# Patient Record
Sex: FEMALE | Race: WHITE | Employment: FULL TIME | URBAN - METROPOLITAN AREA
[De-identification: names, ages, dates, MRNs, and addresses within clinical notes are randomized per-mention and may not be internally consistent; named-entity substitution may affect disease eponyms.]

---

## 2017-03-06 ENCOUNTER — APPOINTMENT (OUTPATIENT)
Dept: GENERAL RADIOLOGY | Age: 57
End: 2017-03-06
Attending: PHYSICIAN ASSISTANT
Payer: COMMERCIAL

## 2017-03-06 ENCOUNTER — HOSPITAL ENCOUNTER (EMERGENCY)
Age: 57
Discharge: HOME | End: 2017-03-06
Attending: EMERGENCY MEDICINE
Payer: COMMERCIAL

## 2017-03-06 VITALS
HEART RATE: 78 BPM | WEIGHT: 135 LBS | BODY MASS INDEX: 21.19 KG/M2 | DIASTOLIC BLOOD PRESSURE: 90 MMHG | OXYGEN SATURATION: 97 % | SYSTOLIC BLOOD PRESSURE: 190 MMHG | RESPIRATION RATE: 18 BRPM | HEIGHT: 67 IN | TEMPERATURE: 98 F

## 2017-03-06 DIAGNOSIS — S92.244A CLOSED NONDISPLACED FRACTURE OF MEDIAL CUNEIFORM OF RIGHT FOOT, INITIAL ENCOUNTER: Primary | ICD-10-CM

## 2017-03-06 PROCEDURE — 99284 EMERGENCY DEPT VISIT MOD MDM: CPT

## 2017-03-06 PROCEDURE — 73630 X-RAY EXAM OF FOOT: CPT

## 2017-03-06 PROCEDURE — 29515 APPLICATION SHORT LEG SPLINT: CPT

## 2017-03-06 RX ORDER — HYDROCODONE BITARTRATE AND ACETAMINOPHEN 5; 325 MG/1; MG/1
1 TABLET ORAL ONCE
Status: COMPLETED | OUTPATIENT
Start: 2017-03-06 | End: 2017-03-06

## 2017-03-06 RX ORDER — HYDROCODONE BITARTRATE AND ACETAMINOPHEN 5; 325 MG/1; MG/1
1 TABLET ORAL EVERY 6 HOURS PRN
Qty: 17 TABLET | Refills: 0 | Status: SHIPPED | OUTPATIENT
Start: 2017-03-06 | End: 2019-01-21 | Stop reason: ALTCHOICE

## 2017-03-06 NOTE — ED PROVIDER NOTES
I reviewed that chart and discussed the case. I have examined the patient and noted right foot injury after trip and fall. I agree with the following clinical impression(s):   Foot fracture, immobilization, referral to orthopedist    I agree with the

## 2017-03-06 NOTE — ED PROVIDER NOTES
Patient Seen in: THE [facility] Emergency Department In [place]    History   Patient presents with:  Lower Extremity Injury (musculoskeletal)  Fall (musculoskeletal, neurologic)    Stated Complaint: FALL, RIGHT FOOT INJURY,  DENIES HITTING HEAD    HPI    62-y Temp src 03/06/17 1605 Oral   SpO2 03/06/17 1605 97 %   O2 Device 03/06/17 1605 None (Room air)       Current:/90 mmHg  Pulse 78  Temp(Src) 98.4 °F (36.9 °C) (Oral)  Resp 18  Ht 170.2 cm (5' 7\")  Wt 61.236 kg  BMI 21.14 kg/m2  SpO2 97%        Physic 3/6/2017  PROCEDURE:  XR FOOT, COMPLETE (MIN 3 VIEWS), RIGHT (CPT=73630)  TECHNIQUE:  AP, oblique, and lateral views were obtained. COMPARISON:  None.   INDICATIONS:  FALL, RIGHT FOOT INJURY,  DENIES HITTING HEAD  PATIENT STATED HISTORY:  Patient was walki Medications Prescribed:  Current Discharge Medication List    START taking these medications    HYDROcodone-acetaminophen 5-325 MG Oral Tab  Take 1 tablet by mouth every 6 (six) hours as needed for Pain.   Qty: 17 tablet Refills: 0  Associated Diagnoses:Melania

## 2019-08-19 PROBLEM — M65.4 DE QUERVAIN'S TENOSYNOVITIS, LEFT: Status: ACTIVE | Noted: 2019-08-19

## 2019-11-24 ENCOUNTER — APPOINTMENT (OUTPATIENT)
Dept: GENERAL RADIOLOGY | Age: 59
End: 2019-11-24
Attending: PHYSICIAN ASSISTANT
Payer: COMMERCIAL

## 2019-11-24 ENCOUNTER — HOSPITAL ENCOUNTER (EMERGENCY)
Age: 59
Discharge: HOME | End: 2019-11-24
Payer: COMMERCIAL

## 2019-11-24 VITALS
DIASTOLIC BLOOD PRESSURE: 88 MMHG | WEIGHT: 150 LBS | SYSTOLIC BLOOD PRESSURE: 163 MMHG | BODY MASS INDEX: 23.27 KG/M2 | TEMPERATURE: 98 F | OXYGEN SATURATION: 99 % | HEART RATE: 77 BPM | HEIGHT: 67.5 IN | RESPIRATION RATE: 16 BRPM
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
## 2019-11-24 DIAGNOSIS — S80.02XA CONTUSION OF LEFT KNEE, INITIAL ENCOUNTER: Primary | ICD-10-CM

## 2019-11-24 DIAGNOSIS — S80.212A ABRASION OF LEFT KNEE, INITIAL ENCOUNTER: ICD-10-CM

## 2019-11-24 DIAGNOSIS — M25.462 KNEE EFFUSION, LEFT: ICD-10-CM

## 2019-11-24 PROCEDURE — 99283 EMERGENCY DEPT VISIT LOW MDM: CPT

## 2019-11-24 PROCEDURE — 73562 X-RAY EXAM OF KNEE 3: CPT | Performed by: PHYSICIAN ASSISTANT

## 2019-11-24 NOTE — ED PROVIDER NOTES
Patient Seen in: [facility] In [place]      History   Patient presents with:  Lower Extremity Injury (musculoskeletal)    Stated Complaint: left knee injury that occurred yesterday     23-year-old  female with a history of hy Left knee: She exhibits decreased range of motion, swelling, effusion and bony tenderness.  She exhibits no ecchymosis, no deformity, no laceration, no erythema, normal alignment, no LCL laxity, normal patellar mobility, normal meniscus and no MCL laxity Contusion of left knee, initial encounter  (primary encounter diagnosis)  Abrasion of left knee, initial encounter  Knee effusion, left    Disposition:  Discharge  11/24/2019 12:14 pm    Follow-up:  MD Pete Brennan. Jagdish Carrasco 91 2

## 2019-12-26 ENCOUNTER — APPOINTMENT (OUTPATIENT)
Dept: GENERAL RADIOLOGY | Age: 59
End: 2019-12-26
Attending: EMERGENCY MEDICINE
Payer: COMMERCIAL

## 2019-12-26 ENCOUNTER — HOSPITAL ENCOUNTER (EMERGENCY)
Age: 59
Discharge: HOME | End: 2019-12-26
Attending: EMERGENCY MEDICINE
Payer: COMMERCIAL

## 2019-12-26 VITALS
OXYGEN SATURATION: 98 % | WEIGHT: 150 LBS | RESPIRATION RATE: 16 BRPM | SYSTOLIC BLOOD PRESSURE: 168 MMHG | BODY MASS INDEX: 23.54 KG/M2 | HEART RATE: 80 BPM | DIASTOLIC BLOOD PRESSURE: 78 MMHG | HEIGHT: 67 IN | TEMPERATURE: 98 F

## 2019-12-26 DIAGNOSIS — S39.012A STRAIN OF LUMBAR REGION, INITIAL ENCOUNTER: Primary | ICD-10-CM

## 2019-12-26 PROCEDURE — 99283 EMERGENCY DEPT VISIT LOW MDM: CPT

## 2019-12-26 PROCEDURE — 96372 THER/PROPH/DIAG INJ SC/IM: CPT

## 2019-12-26 PROCEDURE — 72110 X-RAY EXAM L-2 SPINE 4/>VWS: CPT | Performed by: EMERGENCY MEDICINE

## 2019-12-26 RX ORDER — TRAMADOL HYDROCHLORIDE 50 MG/1
50 TABLET ORAL EVERY 6 HOURS PRN
Qty: 10 TABLET | Refills: 0 | Status: SHIPPED | OUTPATIENT
Start: 2019-12-26 | End: 2020-08-20

## 2019-12-26 RX ORDER — KETOROLAC TROMETHAMINE 30 MG/ML
60 INJECTION, SOLUTION INTRAMUSCULAR; INTRAVENOUS ONCE
Status: COMPLETED | OUTPATIENT
Start: 2019-12-26 | End: 2019-12-26

## 2019-12-26 NOTE — ED INITIAL ASSESSMENT (HPI)
\"lifted a table on Monday, hurt my back, not getting any better\", denies loss of bowel/bladder, no radiation of pain, took naproxen at 0300 today, pain is lower middle back

## 2019-12-26 NOTE — ED PROVIDER NOTES
Patient Seen in: [facility] Emergency Department In [place]      History   Patient presents with:  Back Pain    Stated Complaint: back pain    HPI    The patient is a 40-year-old female who presents emergency room with a history of right-sided lower back 0711 98 °F (36.7 °C)   Temp src 12/26/19 0711 Temporal   SpO2 12/26/19 0711 98 %   O2 Device 12/26/19 0711 None (Room air)       Current:BP (!) 168/78   Pulse 80   Temp 98 °F (36.7 °C) (Temporal)   Resp 16   Ht 170.2 cm (5' 7\")   Wt 68 kg   SpO2 98%   BMI 12/26/2019  CONCLUSION:   Mild which deformity noted of the T12 vertebral body which is overall age indeterminate. If there are symptoms referable this region then an MRI would be recommended.   Mild multilevel degenerative changes of the lumbar spine are

## 2021-02-02 ENCOUNTER — APPOINTMENT (OUTPATIENT)
Dept: GENERAL RADIOLOGY | Age: 61
End: 2021-02-02
Attending: EMERGENCY MEDICINE
Payer: COMMERCIAL

## 2021-02-02 ENCOUNTER — HOSPITAL ENCOUNTER (EMERGENCY)
Age: 61
Discharge: HOME | End: 2021-02-02
Attending: EMERGENCY MEDICINE
Payer: COMMERCIAL

## 2021-02-02 VITALS
BODY MASS INDEX: 21.97 KG/M2 | SYSTOLIC BLOOD PRESSURE: 174 MMHG | DIASTOLIC BLOOD PRESSURE: 87 MMHG | WEIGHT: 140 LBS | HEIGHT: 67 IN | OXYGEN SATURATION: 96 % | RESPIRATION RATE: 18 BRPM | TEMPERATURE: 98 F | HEART RATE: 100 BPM

## 2021-02-02 DIAGNOSIS — S93.602A SPRAIN OF LEFT FOOT, INITIAL ENCOUNTER: Primary | ICD-10-CM

## 2021-02-02 PROCEDURE — 99283 EMERGENCY DEPT VISIT LOW MDM: CPT

## 2021-02-02 PROCEDURE — 73630 X-RAY EXAM OF FOOT: CPT | Performed by: EMERGENCY MEDICINE

## 2021-02-02 NOTE — ED PROVIDER NOTES
Patient Seen in: [facility] Emergency Department In [place]      History   Patient presents with:  Leg or Foot Injury    Stated Complaint: left foot pain    HPI/Subjective:   HPI    69-year-old female presents emergency room for evaluation of left foot pa the lateral aspect of the left foot without discrete tenderness at the base of fifth metatarsal.  No other tenderness to any bones of the foot. [employer] refill is in 2 seconds all digits. NEUROLOGIC EXAM: No foot drop, sensation intact all digits.   SKIN:

## 2021-03-01 PROBLEM — Z00.00 ANNUAL PHYSICAL EXAM: Status: ACTIVE | Noted: 2021-03-01

## 2021-03-01 PROBLEM — Z13.220 SCREENING CHOLESTEROL LEVEL: Status: ACTIVE | Noted: 2021-03-01

## 2021-03-01 PROBLEM — Z12.31 SCREENING MAMMOGRAM, ENCOUNTER FOR: Status: ACTIVE | Noted: 2021-03-01

## 2021-03-01 PROBLEM — Z12.12 SCREENING FOR COLORECTAL CANCER: Status: ACTIVE | Noted: 2021-03-01

## 2021-03-01 PROBLEM — Z13.228 SCREENING FOR METABOLIC DISORDER: Status: ACTIVE | Noted: 2021-03-01

## 2021-03-01 PROBLEM — Z23 NEED FOR ZOSTER VACCINATION: Status: ACTIVE | Noted: 2021-03-01

## 2021-03-01 PROBLEM — Z00.00 PREVENTATIVE HEALTH CARE: Status: ACTIVE | Noted: 2021-03-01

## 2021-03-01 PROBLEM — Z13.1 SCREENING FOR DIABETES MELLITUS: Status: ACTIVE | Noted: 2021-03-01

## 2021-03-01 PROBLEM — Z12.11 SCREENING FOR COLORECTAL CANCER: Status: ACTIVE | Noted: 2021-03-01

## 2021-03-01 PROBLEM — Z12.4 SCREENING FOR CERVICAL CANCER: Status: ACTIVE | Noted: 2021-03-01

## 2022-01-20 ENCOUNTER — APPOINTMENT (OUTPATIENT)
Dept: GENERAL RADIOLOGY | Age: 62
End: 2022-01-20
Attending: PHYSICIAN ASSISTANT
Payer: COMMERCIAL

## 2022-01-20 ENCOUNTER — HOSPITAL ENCOUNTER (EMERGENCY)
Age: 62
Discharge: HOME | End: 2022-01-20
Attending: EMERGENCY MEDICINE
Payer: COMMERCIAL

## 2022-01-20 VITALS
RESPIRATION RATE: 18 BRPM | TEMPERATURE: 97 F | WEIGHT: 150 LBS | OXYGEN SATURATION: 100 % | DIASTOLIC BLOOD PRESSURE: 80 MMHG | HEART RATE: 74 BPM | BODY MASS INDEX: 23.54 KG/M2 | SYSTOLIC BLOOD PRESSURE: 183 MMHG | HEIGHT: 67 IN

## 2022-01-20 DIAGNOSIS — M54.50 ACUTE MIDLINE LOW BACK PAIN WITHOUT SCIATICA: Primary | ICD-10-CM

## 2022-01-20 PROCEDURE — 72110 X-RAY EXAM L-2 SPINE 4/>VWS: CPT | Performed by: PHYSICIAN ASSISTANT

## 2022-01-20 PROCEDURE — 99284 EMERGENCY DEPT VISIT MOD MDM: CPT

## 2022-01-20 PROCEDURE — 99285 EMERGENCY DEPT VISIT HI MDM: CPT

## 2022-01-20 PROCEDURE — 96374 THER/PROPH/DIAG INJ IV PUSH: CPT

## 2022-01-20 PROCEDURE — 96375 TX/PRO/DX INJ NEW DRUG ADDON: CPT

## 2022-01-20 RX ORDER — KETOROLAC TROMETHAMINE 30 MG/ML
30 INJECTION, SOLUTION INTRAMUSCULAR; INTRAVENOUS ONCE
Status: COMPLETED | OUTPATIENT
Start: 2022-01-20 | End: 2022-01-20

## 2022-01-20 RX ORDER — DIAZEPAM 5 MG/1
5 TABLET ORAL ONCE
Status: COMPLETED | OUTPATIENT
Start: 2022-01-20 | End: 2022-01-20

## 2022-01-20 RX ORDER — METHYLPREDNISOLONE 4 MG/1
TABLET ORAL
Qty: 1 EACH | Refills: 0 | Status: SHIPPED | OUTPATIENT
Start: 2022-01-20

## 2022-01-20 RX ORDER — METHYLPREDNISOLONE SODIUM SUCCINATE 125 MG/2ML
125 INJECTION, POWDER, LYOPHILIZED, FOR SOLUTION INTRAMUSCULAR; INTRAVENOUS ONCE
Status: COMPLETED | OUTPATIENT
Start: 2022-01-20 | End: 2022-01-20

## 2022-01-20 RX ORDER — DIAZEPAM 5 MG/1
5 TABLET ORAL 3 TIMES DAILY PRN
Qty: 20 TABLET | Refills: 0 | Status: SHIPPED | OUTPATIENT
Start: 2022-01-20 | End: 2022-01-27

## 2022-01-20 RX ORDER — MORPHINE SULFATE 4 MG/ML
4 INJECTION, SOLUTION INTRAMUSCULAR; INTRAVENOUS ONCE
Status: COMPLETED | OUTPATIENT
Start: 2022-01-20 | End: 2022-01-20

## 2022-01-20 RX ORDER — HYDROMORPHONE HYDROCHLORIDE 1 MG/ML
1 INJECTION, SOLUTION INTRAMUSCULAR; INTRAVENOUS; SUBCUTANEOUS ONCE
Status: COMPLETED | OUTPATIENT
Start: 2022-01-20 | End: 2022-01-20

## 2022-01-20 RX ORDER — HYDROCODONE BITARTRATE AND ACETAMINOPHEN 5; 325 MG/1; MG/1
1 TABLET ORAL ONCE
Status: DISCONTINUED | OUTPATIENT
Start: 2022-01-20 | End: 2022-01-20

## 2022-01-20 RX ORDER — HYDROCODONE BITARTRATE AND ACETAMINOPHEN 5; 325 MG/1; MG/1
1-2 TABLET ORAL EVERY 6 HOURS PRN
Qty: 10 TABLET | Refills: 0 | Status: SHIPPED | OUTPATIENT
Start: 2022-01-20 | End: 2022-01-25

## 2022-01-20 RX ORDER — HYDROMORPHONE HYDROCHLORIDE 1 MG/ML
0.5 INJECTION, SOLUTION INTRAMUSCULAR; INTRAVENOUS; SUBCUTANEOUS ONCE
Status: DISCONTINUED | OUTPATIENT
Start: 2022-01-20 | End: 2022-01-20

## 2022-01-20 NOTE — ED PROVIDER NOTES
Patient Seen in: THE [facility] Emergency Department In [place]      History   Patient presents with:  Back Pain    Stated Complaint: moving furniture, now back pain    Subjective:   HPI    Pleasant 41-year-old female.   3 days prior to arrival, patient \"thre Two-point discrimination is intact. DTRs are appropriate and equal.  Back: Full range of motion. Point tenderness to the L3 region. No piriformis involvement. Bilateral low-grade step-off tenderness.   Negative straight leg raise  Skin: No sign of traum incontinence. Sent for plain film x-rays. If benign will place on anti-inflammatories and muscle relaxants. CONCLUSION:    The bones are demineralized.   Loss of height from the T12 vertebra, which was abnormal on the prior exam from more than 2 year directed  Qty: 1 each Refills: 0

## 2022-01-21 ENCOUNTER — PATIENT OUTREACH (OUTPATIENT)
Dept: CASE MANAGEMENT | Age: 62
End: 2022-01-21

## 2022-01-21 NOTE — PROGRESS NOTES
Pt called left VM on TST line requesting assistance with scheduling ED f/up appt. Please call pt back.

## 2022-01-21 NOTE — PROGRESS NOTES
VM received; pt requesting assistance w/scheduling (gordon 01/20)    Dr Jacqueline Riley  Internal Medicine  [place] [address] [phone]  Apt made:  Tue 01/25 @11:30am    Dr Maru Apodaca  Anesthesiology Pain Medicine, [address] [place] [address]

## 2022-01-24 ENCOUNTER — OFFICE VISIT (OUTPATIENT)
Dept: PAIN CLINIC | Facility: CLINIC | Age: 62
End: 2022-01-24
Payer: COMMERCIAL

## 2022-01-24 VITALS
BODY MASS INDEX: 23 KG/M2 | OXYGEN SATURATION: 94 % | HEART RATE: 88 BPM | WEIGHT: 150 LBS | DIASTOLIC BLOOD PRESSURE: 80 MMHG | SYSTOLIC BLOOD PRESSURE: 148 MMHG

## 2022-01-24 DIAGNOSIS — M43.9 COMPRESSION DEFORMITY OF VERTEBRA: Primary | ICD-10-CM

## 2022-01-24 DIAGNOSIS — M54.50 ACUTE BILATERAL LOW BACK PAIN WITHOUT SCIATICA: ICD-10-CM

## 2022-01-24 PROCEDURE — 99204 OFFICE O/P NEW MOD 45 MIN: CPT | Performed by: PHYSICIAN ASSISTANT

## 2022-01-24 PROCEDURE — 3077F SYST BP >= 140 MM HG: CPT | Performed by: PHYSICIAN ASSISTANT

## 2022-01-24 PROCEDURE — 3079F DIAST BP 80-89 MM HG: CPT | Performed by: PHYSICIAN ASSISTANT

## 2022-01-24 NOTE — PROGRESS NOTES
Patient: Bob Tamayo  Medical Record Number: [id number]  Site: [facility]  Referring Physician:  ER  PCP: Dr. Primo Fink:      Bob Tamayo is a 57year old female, who complains of low back 5-325 MG Oral Tab Take 1 tablet by mouth every 6 (six) hours as needed for Pain. 10 tablet 0   • diazePAM 5 MG Oral Tab Take 1 tablet (5 mg total) by mouth 3 (three) times daily as needed for Anxiety.  20 tablet 0   • HYDROcodone-acetaminophen 5-325 MG Oral Encounters:  01/20/22 : 150 lb (68 kg)    The patient is well developed, well nourished, normal body habitus, well muscled. She moves independently from sitting to standing with ease.        Inspection:  No acute distress    Patient displays Antalgic gait, prescribed: N/A  Pain medications are prescribed by: N/A  [facility] Prescription Monitoring review: N/A  DIRE: N/A  Treatment decision: N/A    MEDICAL DECISION MAKING:     Impression: compression deformity, acute low back pain    1 week history of acute fla

## 2022-01-24 NOTE — PROGRESS NOTES
Subjective:   Patient ID: Dylan Mccoy is a 57year old female.     HPI    History/Other:   Review of Systems  Current Outpatient Medications   Medication Sig Dispense Refill   • HYDROcodone-acetaminophen 5-325 MG Oral Tab Take 1 tablet by mouth douglas bilateral    Quality of Pain:   sharp/stabbing and cramping    Origin of Pain:    Lifting    Aggravating Factors:     Other mornings, getting out of bed    Past Treatments for Current Pain Condition:   Other medication    Prior diagnostic testing for your

## 2022-01-25 ENCOUNTER — HOSPITAL ENCOUNTER (OUTPATIENT)
Dept: MRI IMAGING | Facility: HOSPITAL | Age: 62
Discharge: HOME | End: 2022-01-25
Attending: PHYSICIAN ASSISTANT
Payer: COMMERCIAL

## 2022-01-25 ENCOUNTER — TELEPHONE (OUTPATIENT)
Dept: NEUROLOGY | Facility: CLINIC | Age: 62
End: 2022-01-25

## 2022-01-25 DIAGNOSIS — M54.50 ACUTE BILATERAL LOW BACK PAIN WITHOUT SCIATICA: ICD-10-CM

## 2022-01-25 DIAGNOSIS — M43.9 COMPRESSION DEFORMITY OF VERTEBRA: ICD-10-CM

## 2022-01-25 PROBLEM — S32.010A CLOSED COMPRESSION FRACTURE OF BODY OF L1 VERTEBRA (HCC): Status: ACTIVE | Noted: 2022-01-25

## 2022-01-25 PROBLEM — M62.830 BACK MUSCLE SPASM: Status: ACTIVE | Noted: 2022-01-25

## 2022-01-25 PROBLEM — Z09 HOSPITAL DISCHARGE FOLLOW-UP: Status: ACTIVE | Noted: 2022-01-25

## 2022-01-25 PROCEDURE — 72148 MRI LUMBAR SPINE W/O DYE: CPT | Performed by: PHYSICIAN ASSISTANT

## 2022-01-25 NOTE — TELEPHONE ENCOUNTER
LMTCB to Replaced by [facility] video visit to go over MRI results per Ivonne Garcia.

## 2022-09-12 ENCOUNTER — OFFICE VISIT (OUTPATIENT)
Dept: FAMILY MEDICINE CLINIC | Facility: CLINIC | Age: 62
End: 2022-09-12
Payer: COMMERCIAL

## 2022-09-12 VITALS
WEIGHT: 140 LBS | RESPIRATION RATE: 18 BRPM | TEMPERATURE: 98 F | SYSTOLIC BLOOD PRESSURE: 116 MMHG | OXYGEN SATURATION: 98 % | HEIGHT: 67 IN | DIASTOLIC BLOOD PRESSURE: 82 MMHG | HEART RATE: 80 BPM | BODY MASS INDEX: 21.97 KG/M2

## 2022-09-12 DIAGNOSIS — J01.40 ACUTE PANSINUSITIS, RECURRENCE NOT SPECIFIED: Primary | ICD-10-CM

## 2022-09-12 RX ORDER — AMOXICILLIN AND CLAVULANATE POTASSIUM 875; 125 MG/1; MG/1
1 TABLET, FILM COATED ORAL 2 TIMES DAILY
Qty: 20 TABLET | Refills: 0 | Status: SHIPPED | OUTPATIENT
Start: 2022-09-12 | End: 2022-09-22

## 2022-10-17 ENCOUNTER — OFFICE VISIT (OUTPATIENT)
Dept: FAMILY MEDICINE CLINIC | Facility: CLINIC | Age: 62
End: 2022-10-17
Payer: COMMERCIAL

## 2022-10-17 VITALS
HEART RATE: 75 BPM | TEMPERATURE: 98 F | WEIGHT: 150 LBS | RESPIRATION RATE: 18 BRPM | BODY MASS INDEX: 24.11 KG/M2 | HEIGHT: 66 IN | SYSTOLIC BLOOD PRESSURE: 148 MMHG | OXYGEN SATURATION: 99 % | DIASTOLIC BLOOD PRESSURE: 83 MMHG

## 2022-10-17 DIAGNOSIS — R09.81 NASAL CONGESTION: ICD-10-CM

## 2022-10-17 DIAGNOSIS — B35.3 TINEA PEDIS OF BOTH FEET: Primary | ICD-10-CM

## 2022-10-17 PROCEDURE — 3008F BODY MASS INDEX DOCD: CPT | Performed by: NURSE PRACTITIONER

## 2022-10-17 PROCEDURE — 99214 OFFICE O/P EST MOD 30 MIN: CPT | Performed by: NURSE PRACTITIONER

## 2022-10-17 PROCEDURE — 3079F DIAST BP 80-89 MM HG: CPT | Performed by: NURSE PRACTITIONER

## 2022-10-17 PROCEDURE — 3077F SYST BP >= 140 MM HG: CPT | Performed by: NURSE PRACTITIONER

## 2023-02-14 ENCOUNTER — TELEPHONE (OUTPATIENT)
Dept: SCHEDULING | Age: 63
End: 2023-02-14

## 2024-06-14 ENCOUNTER — APPOINTMENT (OUTPATIENT)
Dept: GENERAL RADIOLOGY | Age: 64
End: 2024-06-14
Attending: EMERGENCY MEDICINE

## 2024-06-14 ENCOUNTER — HOSPITAL ENCOUNTER (EMERGENCY)
Age: 64
Discharge: HOME | End: 2024-06-14
Attending: EMERGENCY MEDICINE

## 2024-06-14 VITALS
DIASTOLIC BLOOD PRESSURE: 69 MMHG | RESPIRATION RATE: 16 BRPM | HEART RATE: 86 BPM | BODY MASS INDEX: 23.3 KG/M2 | TEMPERATURE: 98 F | WEIGHT: 145 LBS | HEIGHT: 66 IN | SYSTOLIC BLOOD PRESSURE: 113 MMHG | OXYGEN SATURATION: 95 %

## 2024-06-14 DIAGNOSIS — S62.102A CLOSED FRACTURE OF LEFT WRIST, INITIAL ENCOUNTER: Primary | ICD-10-CM

## 2024-06-14 DIAGNOSIS — S82.001A CLOSED NONDISPLACED FRACTURE OF RIGHT PATELLA, UNSPECIFIED FRACTURE MORPHOLOGY, INITIAL ENCOUNTER: ICD-10-CM

## 2024-06-14 PROCEDURE — 73110 X-RAY EXAM OF WRIST: CPT | Performed by: EMERGENCY MEDICINE

## 2024-06-14 PROCEDURE — 73562 X-RAY EXAM OF KNEE 3: CPT | Performed by: EMERGENCY MEDICINE

## 2024-06-14 PROCEDURE — 73100 X-RAY EXAM OF WRIST: CPT | Performed by: EMERGENCY MEDICINE

## 2024-06-14 PROCEDURE — 99285 EMERGENCY DEPT VISIT HI MDM: CPT

## 2024-06-14 PROCEDURE — 99284 EMERGENCY DEPT VISIT MOD MDM: CPT

## 2024-06-14 PROCEDURE — 96374 THER/PROPH/DIAG INJ IV PUSH: CPT

## 2024-06-14 RX ORDER — HYDROCODONE BITARTRATE AND ACETAMINOPHEN 5; 325 MG/1; MG/1
1-2 TABLET ORAL EVERY 6 HOURS PRN
Qty: 10 TABLET | Refills: 0 | Status: SHIPPED | OUTPATIENT
Start: 2024-06-14 | End: 2024-06-19

## 2024-06-14 NOTE — DISCHARGE INSTRUCTIONS
Do not lift anything with your left arm until cleared by the orthopedic doctor.  Weight-bear as tolerated while wearing the knee immobilizer.  Do not overexert yourself until cleared by the orthopod.  Follow-up with them on Monday for further care.  Take the pain medicine as needed.  The pain medicine can make you drowsy, so be careful taking it.

## 2024-06-14 NOTE — ED PROVIDER NOTES
Patient Seen in: [facility] In [place]      History     Chief Complaint   Patient presents with    Fall     Stated Complaint: fall-left wrist injury    Subjective:   HPI    64-year-old female presents today for evaluation for mechanical fall.  Patient tripped on an uneven sidewalk.  She broke her fall with her left arm.  Patient denies any head injury or loss of conscious.  She denies any blood thinner use.  Patient's predominant area of pain is her left wrist.  She also has some slight discomfort in her right knee as well.  She denies any other injury.  Patient is left-hand dominant    Objective:   Past Medical History:    Essential hypertension    Onychomycosis              History reviewed. No pertinent surgical history.             Social History     Socioeconomic History    Marital status: Single   Tobacco Use    Smoking status: Former     Current packs/day: 0.00     Types: Cigarettes     Start date: 1992     Quit date: 2012     Years since quittin.4    Smokeless tobacco: Never   Vaping Use    Vaping status: Never Used   Substance and Sexual Activity    Alcohol use: Yes     Comment: 1 drink/day    Drug use: No              Review of Systems    Positive for stated complaint: fall-left wrist injury  Other systems are as noted in HPI.  Constitutional and vital signs reviewed.      All other systems reviewed and negative except as noted above.    Physical Exam     ED Triage Vitals [24 1344]   /75   Pulse 79   Resp 18   Temp 98.2 °F (36.8 °C)   Temp src Oral   SpO2 97 %   O2 Device None (Room air)       Current Vitals:   Vital Signs  BP: 113/69  Pulse: 86  Resp: 16  Temp: 98.2 °F (36.8 °C)  Temp src: Oral    Oxygen Therapy  SpO2: 95 %  O2 Device: None (Room air)            Physical Exam  Constitutional:       Appearance: Normal appearance.   HENT:      Head: Normocephalic.      Nose: Nose normal.      Mouth/Throat:      Mouth: Mucous membranes are moist.   Eyes:       Extraocular Movements: Extraocular movements intact.   Cardiovascular:      Rate and Rhythm: Normal rate.   Pulmonary:      Effort: Pulmonary effort is normal.   Abdominal:      General: Abdomen is flat.   Musculoskeletal:         General: Normal range of motion.      Comments: Palpable left radial pulse.  Bruising and suspected deformity present of the left wrist.  Skin is intact.    Swelling and abrasion noted over the inferior aspect of the right patella.  No bony deformity or tenderness   Skin:     General: Skin is warm.   Neurological:      General: No focal deficit present.      Mental Status: She is alert.   Psychiatric:         Mood and Affect: Mood normal.           ED Course   Labs Reviewed - No data to display          XR WRIST (2 VIEWS), LEFT (CPT=73100)    Result Date: 6/14/2024  PROCEDURE:  XR WRIST (2 VIEWS), LEFT (CPT=73100)  INDICATIONS:  fall-left wrist injury  COMPARISON:  None.  PATIENT STATED HISTORY: (As transcribed by Technologist)  Post reduction left wrist.               CONCLUSION:   Impacted markedly comminuted fracture of the distal left radial metaphysis extends to the radiocarpal joint space with distraction multiple bone fragments.  Volar displacement of the largest fragment is noted.  Distal ulnar comminuted fracture with displacement ulnar styloid process.   LOCATION:  [place] [id number]   Dictated by (CST): Rob Arguello MD on 6/14/2024 at 4:58 PM     Finalized by (CST): Rob Arguello MD on 6/14/2024 at 5:00 PM       XR KNEE (3 VIEWS), RIGHT (CPT=73562)    Result Date: 6/14/2024            PROCEDURE:  XR KNEE ROUTINE (3 VIEWS), RIGHT (CPT=73562)  TECHNIQUE:  Three views were obtained including patellar view.  COMPARISON:  None.  INDICATIONS:  Right knee pain  PATIENT STATED HISTORY: (As transcribed by Technologist)  The patient fell on6/14/2024 and she has anterior right knee pain. The patient states her right knee is throbbing and she has swelling and bruising.     FINDINGS:    Transverse comminuted fracture of the mid patella is noted.  There is distraction of the upper and lower poles of 1.7 cm.  Angulation lower pole fracture fragment is noted.  Associated small to moderate suprapatellar joint effusion.    LOCATION:  [id number]   Dictated by ([facility]): Rob Arguello MD on 6/14/2024 at 3:16 PM     Finalized by (Dr[facility]): Rob Arguello MD on 6/14/2024 at 3:17 PM       XR WRIST COMPLETE (MIN 3 VIEWS), LEFT (CPT=73110)    Result Date: 6/14/2024  PROCEDURE:  XR WRIST COMPLETE (MIN 3 VIEWS), LEFT (CPT=73110)  TECHNIQUE:  Three views were obtained.  COMPARISON:  None.  INDICATIONS:  fall-left wrist injury  PATIENT STATED HISTORY: (As transcribed by Technologist)  Patient states that she fell on 6/14/2024 and the lateral aspect of her left wrist is in pain. The patient's left wrist is swollen and she has very limited range of motion.    FINDINGS:             CONCLUSION:   1. Impacted comminuted fracture of the distal left radial metaphysis extending to the radiocarpal joint space.  There is apex dorsal angulation.  Displacement of distal radial articular fragment ventrally approximately 1.6 cm.  2. Distal ulnar metaphyseal fracture is comminuted with ventral displacement of the ulnar styloid and distal metaphyseal fracture fragments.  Bolivar dorsal angulation is also noted.    LOCATION:  [id number]   Dictated by (Dr[facility]): Rob Arguello MD on 6/14/2024 at 3:13 PM     Finalized by (CST): Rob Arguello MD on 6/14/2024 at 3:16 PM               MDM      Differential Diagnosis  64-year-old female presents today for evaluation following mechanical fall.  Patient has significant bruising, swelling suspected deformity to the left wrist.  Left upper extremity is neurovascularly intact.  Skin is intact.  Differential would include fracture or contusion.  Additionally, patient has some swelling to the inferior right patella.  Will obtain x-ray to assess for bony abnormality.  Will reassess.    5:05  pm  X-ray demonstrated distal radius fracture.  Following hematoma block, I was able to better align the wrist although the fracture seems very unstable.  A postmold was applied and patient has good cap refill following postmold application.  Additionally, patient was noted to have a right patellar fracture.  I reviewed the case with orthopedics Dr. Stallings who was able to review imaging.  She agreed with plan for postmold and recommended a knee immobilizer of the right leg.  Patient's pain seems well-controlled, Ortho recommendation was for follow-up on Monday.  Patient is comfortable with plan at this time.  She will follow-up with orthopedics on Monday as instructed and return for any worsening symptoms.    Discussions of Management  Case reviewed with orthopedics    Independent Interpretation  I independently interpreted the x-ray, a distal radius fracture and patellar fracture were noted                                   Medical Decision Making      Disposition and Plan     Clinical Impression:  1. Closed fracture of left wrist, initial encounter    2. Closed nondisplaced fracture of right patella, unspecified fracture morphology, initial encounter         Disposition:  Discharge  6/14/2024  5:12 pm    Follow-up:  Gianna Stallings MD  [address] [place] [id number]  [phone]    Call in 1 day(s)            Medications Prescribed:  Discharge Medication List as of 6/14/2024  5:13 PM        START taking these medications    Details   HYDROcodone-acetaminophen 5-325 MG Oral Tab Take 1-2 tablets by mouth every 6 (six) hours as needed., Normal, Disp-10 tablet, R-0

## 2024-06-14 NOTE — ED INITIAL ASSESSMENT (HPI)
Patient was walking and tripped on uneven sidewalk-deformity noted to left wrist and also c/o right knee pain

## (undated) NOTE — LETTER
Date & Time: 11/24/2019, 12:29 PM  Patient: Callie Curtis  Encounter Provider(s):    SLICK Chappell       To Whom It May Concern:    Blase Dancer was seen and treated in our department on 11/24/2019.  She may return to work 11/27/201

## (undated) NOTE — ED AVS SNAPSHOT
[facility] Emergency Department in [address] [facility]    Phone:  [phone]    Fax:  [phone]           Ms. Dina Esparza   MRN: [id number]    Department:  [facility] Emergency Department in [facility] prescription you begin. You were diagnosed with a broken bone today. You will need to follow up with an orthopedic doctor. Orthopedic doctors specialize in disorders of the bones and joints.   He or she will determine the final treatment plan for your [address] [place] [phone]  Pediatric [address] Emergency Department   [phone]       To Check ER Wait Times:  TEXT 'ERwait' to [phone]      Click [email or web address]      Or call [phone]    If you have any will be contacted. Please make sure we have your correct phone number before you leave. After you leave, you should follow the attached instructions. I have read and understand the instructions given to me by my caregivers.         [facility] XR FOOT, COMPLETE (MIN 3 VIEWS), RIGHT (CPT=73630) (Final result) Result time:  03/06/17 16:30:53    Final result    Impression:    PROCEDURE:  XR FOOT, COMPLETE (MIN 3 VIEWS), RIGHT (CPT=73630)     TECHNIQUE:  AP, oblique, and lateral views were obtained

## (undated) NOTE — ED AVS SNAPSHOT
THE [facility] Emergency Department in [address] [facility]    Phone:  [phone]    Fax:  [phone]           Ms. Karyle Stanley   MRN: [id number]    Department:  THE [facility] Emergency Department in [facility] IF THERE IS ANY CHANGE OR WORSENING OF YOUR CONDITION, CALL YOUR PRIMARY CARE PHYSICIAN AT ONCE OR RETURN IMMEDIATELY TO THE EMERGENCY DEPARTMENT.     If you have been prescribed any medication(s), please fill your prescription right away and begin taking

## (undated) NOTE — ED AVS SNAPSHOT
Tri Clemons   MRN: [id number]    Department:  [facility] Emergency Department in [place]   Date of Visit:  11/24/2019           Disclosure     Insurance plans vary and the physician(s) referred by the ER may not be covered by your plan.  Please tell this physician (or your personal doctor if your instructions are to return to your personal doctor) about any new or lasting problems. The primary care or specialist physician will see patients referred from the [facility] Emergency Department.  Salvadore Skiff

## (undated) NOTE — ED AVS SNAPSHOT
Ata Cole   MRN: [id number]    Department:  THE [facility] Emergency Department in [place]   Date of Visit:  12/26/2019           Disclosure     Insurance plans vary and the physician(s) referred by the ER may not be covered by your plan.  Please tell this physician (or your personal doctor if your instructions are to return to your personal doctor) about any new or lasting problems. The primary care or specialist physician will see patients referred from the [facility] Emergency Department.  Arthor Bernheim

## (undated) NOTE — LETTER
Date & Time: 2/2/2021, 10:58 AM  [facility]  Encounter Provider(s):    Celina Ruff,        To Whom It May Concern:    Brittani Myrick was seen and treated in our department on 2/2/2021. She should not return to work until 2/5/21.

## (undated) NOTE — LETTER
Date: 1/24/2022    Patient Name: Daphne Wang          To Whom it may concern: This letter has been written at the patient's request. The above patient was seen at the [facility] for treatment of a medical condition.     This patien

## (undated) NOTE — LETTER
Date & Time: 12/26/2019, 8:06 AM  Patient: Mirna Curtis  Encounter Provider(s):    Sabrina Rojo MD       To Whom It May Concern:    Junaid Cobb was seen and treated in our department on 12/26/2019.  She should not return to work until